# Patient Record
(demographics unavailable — no encounter records)

---

## 2018-05-27 NOTE — XRAY REPORT
FINAL REPORT



EXAM:  XR CHEST 1V AP



HISTORY:  Chest Pain 



TECHNIQUE:  A single view of the chest was obtained.



FINDINGS:  

The heart size and mediastinum appear normal. There are no

infiltrates or congestion. Pleural fluid is not seen. There are

several calcified granulomas noted in the right hilum and left

lung base. The bones and soft tissues do not show any acute

changes.



IMPRESSION:  

No active chest disease.

## 2018-05-27 NOTE — EMERGENCY DEPARTMENT REPORT
ED General Adult HPI





- General


Chief complaint: Chest Pain


Stated complaint: CHEST PAIN


Time Seen by Provider: 05/26/18 23:59


Source: patient


Mode of arrival: Stretcher


Limitations: No Limitations





- History of Present Illness


Initial comments: 





Voices possible suicidal ideation previous admits to [facility] 

described chest pain to the nurses left sided here for evaluation of hearing 

voices, suicidal ideation, left-sided chest pain 1 day patient denies drug use 

this poor historian series had left-sided chest pain not clear if it's 

exertional or rest not clear if it's intermittent or constant denies tearing 

pain denies syncope denies fever denies cough denies calf pain or swelling


-: Gradual, days(s)


Radiation: non-radiation


Associated Symptoms: chest pain, other (suicidal ideation and hearing voices).  

denies: confusion, cough, diaphoresis, fever/chills, headaches, loss of appetite

, malaise, nausea/vomiting, rash, seizure, shortness of breath, syncope, 

weakness





- Related Data


 Allergies











Allergy/AdvReac Type Severity Reaction Status Date / Time


 


No Known Allergies Allergy   Verified 05/26/18 23:58














ED Review of Systems


ROS: 


Stated complaint: CHEST PAIN


Other details as noted in HPI





Comment: Unobtainable due to pts medical conditions





ED Past Medical Hx





- Past Medical History


Previous Medical History?: No





- Surgical History


Past Surgical History?: No





- Social History


Smoking Status: Current Every Day Smoker


Substance Use Type: None





ED Physical Exam





- General


Limitations: No Limitations


General appearance: alert, anxious





- Head


Head exam: Present: atraumatic, normocephalic





- Eye


Eye exam: Present: normal appearance, PERRL, EOMI





- ENT


ENT exam: Present: normal exam, normal orophraynx.  Absent: mucous membranes dry

, mucous membranes moist





- Neck


Neck exam: Present: normal inspection.  Absent: tenderness, meningismus





- Respiratory


Respiratory exam: Present: normal lung sounds bilaterally.  Absent: respiratory 

distress, wheezes, rales, rhonchi, stridor, chest wall tenderness





- Cardiovascular


Cardiovascular Exam: Present: regular rate, normal rhythm





- GI/Abdominal


GI/Abdominal exam: Present: soft.  Absent: distended, tenderness, guarding, 

rebound, rigid, mass, pulsatile mass





- Extremities Exam


Extremities exam: Present: normal inspection, normal capillary refill.  Absent: 

pedal edema, joint swelling





- Back Exam


Back exam: Present: normal inspection.  Absent: full ROM, tenderness, CVA 

tenderness (R), CVA tenderness (L), muscle spasm, paraspinal tenderness, 

vertebral tenderness





- Neurological Exam


Neurological exam: Present: alert, CN II-XII intact.  Absent: motor sensory 

deficit





- Psychiatric


Psychiatric exam: Present: anxious, flat affect, suicidal ideation





- Skin


Skin exam: Absent: cyanosis, diaphoretic, erythema, urticaria, vesicles, 

petechiae





ED Course


 Vital Signs











  05/26/18 05/27/18 05/27/18





  23:58 01:11 01:17


 


Temperature 98.5 F  


 


Pulse Rate 111 H  108 H


 


Respiratory 19 19 





Rate   


 


Blood Pressure 148/89  


 


O2 Sat by Pulse 99 99 





Oximetry   














ED Medical Decision Making





- Lab Data


Result diagrams: 


 05/27/18 00:31





 05/27/18 00:31





- EKG Data


-: EKG Interpreted by Me


EKG shows normal: sinus rhythm


Rate: tachycardia





- EKG Data


Interpretation: LVH (no acute ischemic changes), other





- Radiology Data


Radiology results: report reviewed





- Medical Decision Making





Patient was given IV fluids and benzos he was cleared medically he was stable 

for evaluation by psychiatry for suicidal ideation.  He was placed on 1013 in 

the gravely disabled nature the patient with his wrist to self and others he 

will need psychiatric evaluation he is medically cleared for further evaluation 

of psychosis and suicidal ideation


Critical care attestation.: 


If time is entered above; I have spent that time in minutes in the direct care 

of this critically ill patient, excluding procedure time.








ED Disposition


Clinical Impression: 


 Psychosis, Suicidal ideation





Disposition: [id number]/TX-[id number] PSY HOSP/PSY UNIT


Is pt being admited?: No


Condition: Stable


Referrals: 


PRIMARY CARE,MD [Primary Care Provider] - 3-5 Days


Time of Disposition: 04:49

## 2018-05-28 NOTE — CONSULTATION
History of Present Illness





- Reason for Consult


Consult date: 05/28/18


Reason for consult: Mental Health Evaluation


Requesting physician: NASH WEISS





- Chief Complaint


Chief complaint: 


"I am stressed"








- History of Present Psychiatric Illness


41 y.o. AA male presenting to the ER for SI's. Today the patient is calm and 

cooperative during the assessment. He stated being on probation and his life is 

all "messed up." He stated having a substance abuse problem that has gotten 

worse over the past few months. He stated attending several rehabs services in 

the past, but find himself relapsing often. He stated that he worries about his 

future, so he self medicate using recreational drugs. He cannot remember if he 

told a staff member yesterday that he was suicidal. He is adamant about wanting 

help for his substance abuse and assistance contacting his . 

He denies SI/HI's and AVH's. He rate his depression 6/10, with 10 being the 

worse. He stated having a poor appetite and erratic sleep. He denies any manic 

episodes in the past. He acknowledged alcohol consumption (etoh) socially. 








Medications and Allergies


 Allergies











Allergy/AdvReac Type Severity Reaction Status Date / Time


 


No Known Allergies Allergy   Verified 05/26/18 23:58











 Home Medications











 Medication  Instructions  Recorded  Confirmed  Last Taken  Type


 


Unobtainable  05/28/18 05/28/18 Unknown History














Past psychiatric history





- Past Medical History


Past Medical History: No medical history


Past Surgical History: No surgical history





- past Psychiatric treatment and history


psychiatric treatment history: 


Multiple rehab services in the past. Denies a fam psy hx.








- Social History


Social history: lives with family





Mental Status Exam





- Vital signs


 Last Vital Signs











Temp  97.7 F   05/28/18 08:04


 


Pulse  78   05/28/18 08:04


 


Resp  18   05/28/18 08:04


 


BP  128/79   05/28/18 08:04


 


Pulse Ox  96   05/28/18 08:04














- Exam


Narrative exam: 


MSE:





 Appearance: calm, cooperative    


 Behavior: regular eye contact


 Speech: regular rate and tone


 Mood: "depressed and anxious"


 Affect: congruent to mood


 Thought Process: circumstantial         


 Thought Content: denies SI/HI's and AVH's  


 Motor Activity: lying in bed


 Cognition: A/O x 3


 Insight: variable 


 Judgment: variable  








Results


Result Diagrams: 


 05/27/18 00:31





 05/27/18 00:31


All other labs normal.








Assessment and Plan


Assessment and plan: 


Impression: MDD, Severe Type. Substance Use DO (cocaine). Cannabis Use DO. 

Today the patient is calm and cooperative during the assessment.





DDx: R/O Bipolar DO, R/O Substance Induced Mood DO 





Recommendation/Plan: Continue 1013 and reassess the patient in 24 hours. Start 

Remeron 15 mg PO HS for depression and Vistaril 25 mg PO Q6hrs PRN for anxiety. 

Discussed possible suicidality/medication induced ingrid with patient reference 

Remeron.

## 2018-05-29 NOTE — PROGRESS NOTE
Subjective





- Reason for Consult


Consult date: 05/29/18


Reason for consult: Psychiatry Follow-up





- Chief Complaint


Chief complaint: 


"I feel better"





41 y.o. AA male presenting to the ER for SI's. Today the patient is calm and 

cooperative during the assessment. He stated that he feel much better today 

after reflecting on his life. He stated that he look forward to completing a 

rehab services and staying "clean" for good. He stated getting "lots of rest" 

last night. He denies SI/HI's and AVH's. He denies any side effects of his 

medication. 








Mental Status Exam





- Vital signs


 Last Vital Signs











Temp  97.9 F   05/29/18 12:21


 


Pulse  98 H  05/29/18 12:21


 


Resp  20   05/29/18 14:23


 


BP  108/75   05/29/18 12:21


 


Pulse Ox  100   05/29/18 14:23














- Exam


Narrative exam: 


MSE:





 Appearance: calm, cooperative    


 Behavior: regular eye contact


 Speech: regular rate and tone


 Mood: "better"


 Affect: congruent to mood


 Thought Process: linear          


 Thought Content: denies SI/HI's and AVH's  


 Motor Activity: lying in bed


 Cognition: A/O x 3


 Insight: fair 


 Judgment: fair   








Assessment and Plan


Impression: MDD, Severe Type. Substance Use DO (cocaine). Cannabis Use DO. 

Today the patient is calm and cooperative during the assessment.





DDx: R/O Bipolar DO, R/O Substance Induced Mood DO 





Recommendation/Plan: Evaluate 1013 in 24 hours to determine proper dispo. 

Continue Remeron 15 mg PO HS for depression and Vistaril 25 mg PO Q6hrs PRN for 

anxiety. Discussed possible suicidality/medication induced ingrid with patient 

reference Remeron.

## 2018-05-30 NOTE — PROGRESS NOTE
Subjective





- Reason for Consult


Consult date: 05/30/18


Reason for consult: Psychiatric Follow-up Evaluation





- Chief Complaint


Chief complaint: 


"I feel better"





41 y.o. AA male presenting to the ER for SI's. Today the patient is calm and 

cooperative during the assessment. He stated that he feel much better today 

after reflecting on his life. He stated that he look forward to completing a 

rehab services and staying "clean" for good. He stated getting "lots of rest" 

last night. He denies SI/HI's and AVH's. He denies any side effects of his 

medication. 








Mental Status Exam





- Vital signs


 Last Vital Signs











Temp  98.5 F   05/30/18 08:17


 


Pulse  80   05/30/18 08:17


 


Resp  18   05/30/18 08:17


 


BP  101/61   05/30/18 08:17


 


Pulse Ox  95   05/30/18 08:17

## 2018-05-31 NOTE — PROGRESS NOTE
Subjective





- Reason for Consult


Consult date: 05/31/18


Reason for consult: Psychiatry Follow-up





- Chief Complaint


Chief complaint: 


"Good morning"





41 y.o. AA male presenting to the ER for SI's. Today the patient is calm and 

cooperative during the assessment. He stated that he want to stay clean off 

"drugs." He stated that getting his life together is priority. He denies SI/HI'

s and AVH's. He denies any side effects of his medications. 








Mental Status Exam





- Vital signs


 Last Vital Signs











Temp  98.1 F   05/30/18 21:46


 


Pulse  84   05/30/18 21:46


 


Resp  18   05/30/18 21:46


 


BP  107/62   05/30/18 21:46


 


Pulse Ox  96   05/30/18 21:46














- Exam


Narrative exam: 


MSE:





 Appearance: calm, cooperative    


 Behavior: regular eye contact


 Speech: regular rate and tone


 Mood: "better"


 Affect: congruent to mood


 Thought Process: linear          


 Thought Content: denies SI/HI's and AVH's  


 Motor Activity: lying in bed


 Cognition: A/O x 3


 Insight: appropriate 


 Judgment: appropriate














Assessment and Plan


Impression: MDD, Severe Type. Substance Use DO (cocaine). Cannabis Use DO. 

Today the patient is calm and cooperative during the assessment.





DDx: R/O Bipolar DO, R/O Substance Induced Mood DO 





Recommendation/Plan: Rescind 1013. Continue Remeron 15 mg PO HS for depression. 

Discussed possible suicidality/medication induced ingrid with patient reference 

Remeron. The patient can follow up at [facility] for rehab/outpatient psy 

services.